# Patient Record
Sex: FEMALE | NOT HISPANIC OR LATINO | Employment: UNEMPLOYED | URBAN - METROPOLITAN AREA
[De-identification: names, ages, dates, MRNs, and addresses within clinical notes are randomized per-mention and may not be internally consistent; named-entity substitution may affect disease eponyms.]

---

## 2022-12-26 ENCOUNTER — HOSPITAL ENCOUNTER (EMERGENCY)
Facility: HOSPITAL | Age: 72
Discharge: HOME | End: 2022-12-26
Attending: EMERGENCY MEDICINE
Payer: MEDICARE

## 2022-12-26 VITALS
OXYGEN SATURATION: 99 % | BODY MASS INDEX: 30.73 KG/M2 | SYSTOLIC BLOOD PRESSURE: 196 MMHG | HEIGHT: 62 IN | DIASTOLIC BLOOD PRESSURE: 84 MMHG | TEMPERATURE: 99 F | HEART RATE: 72 BPM | RESPIRATION RATE: 19 BRPM | WEIGHT: 167 LBS

## 2022-12-26 DIAGNOSIS — S43.401A SPRAIN OF RIGHT SHOULDER, UNSPECIFIED SHOULDER SPRAIN TYPE, INITIAL ENCOUNTER: Primary | ICD-10-CM

## 2022-12-26 DIAGNOSIS — I10 HYPERTENSION, UNSPECIFIED TYPE: ICD-10-CM
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
## 2022-12-26 PROCEDURE — 99284 EMERGENCY DEPT VISIT MOD MDM: CPT | Mod: 25

## 2022-12-26 PROCEDURE — 25000003 PHARM REV CODE 250: Performed by: EMERGENCY MEDICINE

## 2022-12-26 RX ORDER — AMLODIPINE BESYLATE 5 MG/1
5 TABLET ORAL
Status: COMPLETED | OUTPATIENT
Start: 2022-12-26 | End: 2022-12-26

## 2022-12-26 RX ORDER — METOPROLOL SUCCINATE 50 MG/1
50 TABLET, EXTENDED RELEASE ORAL DAILY
Status: DISCONTINUED | OUTPATIENT
Start: 2022-12-27 | End: 2022-12-26 | Stop reason: HOSPADM

## 2022-12-26 RX ORDER — ACETAMINOPHEN 500 MG
1000 TABLET ORAL
Status: COMPLETED | OUTPATIENT
Start: 2022-12-26 | End: 2022-12-26

## 2022-12-26 RX ADMIN — AMLODIPINE BESYLATE 5 MG: 5 TABLET ORAL at 06:12

## 2022-12-26 RX ADMIN — ACETAMINOPHEN 1000 MG: 500 TABLET ORAL at 06:12

## 2022-12-26 NOTE — FIRST PROVIDER EVALUATION
Medical screening examination initiated.  I have conducted a focused provider triage encounter, findings are as follows:    Brief history of present illness:  trip and fall prior to arrival notes pain to R upper arm, did not hit head, no loc, not on blood thinners    There were no vitals filed for this visit.    Pertinent physical exam:  There were no vitals filed for this visit.    Pt is well appearing, sitting up in no distress  HEADt: normocephalic, atraumatic  Eyes: EOMI, PERRL, ANICTERIC  Chest: normal chest expansion, no respiratory distress  CV: normal rate  Abd: non distended  Ext: no edema  Psych: linear goal directed thinking, no si/hi  Neuro: no tremor      Brief workup plan:  xr r upper arm    Preliminary workup initiated; this workup will be continued and followed by the physician or advanced practice provider that is assigned to the patient when roomed.

## 2022-12-26 NOTE — ED PROVIDER NOTES
Encounter Date: 12/26/2022    SCRIBE #1 NOTE: I, WINNIE Marie, mark scribing for, and in the presence of,  Bran Stewart MD. I have scribed the following portions of the note - Other sections scribed: HPI, ROS, PE.     History     Chief Complaint   Patient presents with    Fall     Pt reports trip and fall 30 min PTA and is now c/o right arm pain post fall. Pt denies head trauma or LOC. Pt denies blood thinners, cp, sob, n/v/d.     Keturah Raya is a 72 y.o. female, with no pertinent PMHx, who presents to the ED with right upper arm pain. Patient reports she tripped and fell 30 mins PTA when exiting the bathroom. Pt says fall was caused by her slippers. Pt denies any LOC or head trauma.  Denies neck pain, back pain, chest pain, abdominal pain, nausea, SOB, or other associated symptoms.     The history is provided by the patient. No  was used.   Review of patient's allergies indicates:  No Known Allergies  Past Medical History:   Diagnosis Date    Mental disorder      No past surgical history on file.  No family history on file.  Social History     Tobacco Use    Smoking status: Never    Smokeless tobacco: Never   Substance Use Topics    Alcohol use: No     Review of Systems   Constitutional:  Negative for appetite change, chills, fatigue and fever.   HENT:  Negative for nosebleeds, rhinorrhea, sneezing and sore throat.    Eyes:  Negative for photophobia, pain and visual disturbance.   Respiratory:  Negative for cough, chest tightness and shortness of breath.    Cardiovascular:  Negative for chest pain, palpitations and leg swelling.   Gastrointestinal:  Negative for abdominal pain, constipation, diarrhea and nausea.   Genitourinary:  Negative for dysuria and urgency.   Musculoskeletal:  Positive for arthralgias (right upper arm). Negative for back pain, gait problem, neck pain and neck stiffness.   Skin:  Negative for color change and rash.   Neurological:  Negative for weakness,  light-headedness, numbness and headaches.   Hematological:  Negative for adenopathy. Does not bruise/bleed easily.   Psychiatric/Behavioral:  Negative for confusion, decreased concentration and suicidal ideas.      Physical Exam     Initial Vitals [12/26/22 1554]   BP Pulse Resp Temp SpO2   (!) 199/86 72 19 98.6 °F (37 °C) 100 %      MAP       --         Physical Exam    Nursing note and vitals reviewed.  Constitutional: She appears well-developed and well-nourished.   HENT:   Head: Normocephalic and atraumatic.   Eyes: EOM are normal. Pupils are equal, round, and reactive to light.   Neck: Neck supple. No thyromegaly present. No JVD present.   Normal range of motion.  Cardiovascular:  Normal rate and regular rhythm.     Exam reveals no gallop and no friction rub.       No murmur heard.  Pulmonary/Chest: Breath sounds normal. No respiratory distress.   Abdominal: Abdomen is soft. Bowel sounds are normal. There is no abdominal tenderness.   Musculoskeletal:         General: No edema. Normal range of motion.      Right upper arm: Tenderness present.      Cervical back: Normal range of motion and neck supple.      Comments: Deltoid muscle tenderness.  Tenderness around glenohumeral junction without erythema, warmth, or swelling.     Neurological: She is alert and oriented to person, place, and time. She has normal strength. GCS score is 15. GCS eye subscore is 4. GCS verbal subscore is 5. GCS motor subscore is 6.   Skin: Skin is warm and dry. Capillary refill takes less than 2 seconds.   Psychiatric: She has a normal mood and affect.       ED Course   Procedures  Labs Reviewed - No data to display       Imaging Results              X-Ray Humerus 2 View Right (Final result)  Result time 12/26/22 16:30:17      Final result by Jovan Hatch MD (12/26/22 16:30:17)                   Impression:      1. No acute displaced fracture or dislocation of the right humerus.      Electronically signed by: Jovan Hatch  MD  Date:    12/26/2022  Time:    16:30               Narrative:    EXAMINATION:  XR HUMERUS 2 VIEW RIGHT    CLINICAL HISTORY:  Unspecified fall, initial encounter    COMPARISON:  None    FINDINGS:  Three views right humerus.    No acute displaced fracture or dislocation of the humerus.  The right shoulder appears intact.  The right elbow appears intact.  There are degenerative changes involving the medial and lateral humeral epicondyles.  No visualized acute displaced rib fracture.                                       X-ray Shoulder 2 or More Views Right (Final result)  Result time 12/26/22 16:28:46   Procedure changed from X-Ray Shoulder Trauma Right     Final result by Jovan Hatch MD (12/26/22 16:28:46)                   Impression:      1. No acute displaced fracture or dislocation of the right shoulder.      Electronically signed by: Jovan Hatch MD  Date:    12/26/2022  Time:    16:28               Narrative:    EXAMINATION:  XR SHOULDER COMPLETE 2 OR MORE VIEWS RIGHT    CLINICAL HISTORY:  pain;Unspecified fall, initial encounter    TECHNIQUE:  Two or three views of the right shoulder were performed.    COMPARISON:  None    FINDINGS:  Two views right shoulder.    The right humeral head maintains appropriate relationship with the glenoid.  The acromioclavicular joint is intact.  No acute displaced right rib fracture.  There is coarse interstitial attenuation of the visualized right lung zones, accentuated by habitus and positioning.                                       Medications   amLODIPine tablet 5 mg (5 mg Oral Given 12/26/22 1807)   acetaminophen tablet 1,000 mg (1,000 mg Oral Given 12/26/22 1807)     Medical Decision Making:   History:   Old Medical Records: I decided to obtain old medical records.  Clinical Tests:   Radiological Study: Ordered and Reviewed   Arm strain after trip and fall.  No evidence of intracranial injury, spinal cord or spine injury, intrathoracic or intra-abdominal  injuries.  X-rays of the right shoulder arm show no acute by bony abnormalities.  Patient's blood pressure is elevated.  Her blood pressure is asymptomatic.  Close follow-up primary care for blood pressure recheck.  Follow-up with orthopedics for arm pain if persists.     Scribe Attestation:   Scribe #1: I performed the above scribed service and the documentation accurately describes the services I performed. I attest to the accuracy of the note.            I, Bran Stewart, personally performed the services described in this documentation.  All medical record entries made by the scribe were at my direction and in my presence.  I have reviewed the chart and agree that the record reflects my personal performance and is accurate and complete.        Clinical Impression:   Final diagnoses:  [W19.XXXA] Fall  [S43.401A] Sprain of right shoulder, unspecified shoulder sprain type, initial encounter (Primary)  [I10] Hypertension, unspecified type        ED Disposition Condition    Discharge Stable          ED Prescriptions    None       Follow-up Information       Follow up With Specialties Details Why Contact Info    Woody Machado MD Orthopedic Surgery Schedule an appointment as soon as possible for a visit   [address] [facility] [address] [facility] [address]  [address]  [place] [address]  [phone]               Bran Stewart MD  01/27/23 1027

## 2022-12-27 NOTE — ED NOTES
Pt presents with c/o right arm pain after trip and fall today.  Pt denies LOC, head, neck, back or abd pain.  Pt is AAOx3, resp even and unlabored, skin warm and dry.  HOB elevated, SR up x 2, call bell within reach. Family member at bedside.  NAD noted.